# Patient Record
Sex: FEMALE | Race: WHITE | NOT HISPANIC OR LATINO
[De-identification: names, ages, dates, MRNs, and addresses within clinical notes are randomized per-mention and may not be internally consistent; named-entity substitution may affect disease eponyms.]

---

## 2018-09-05 ENCOUNTER — TRANSCRIPTION ENCOUNTER (OUTPATIENT)
Age: 68
End: 2018-09-05

## 2019-01-05 ENCOUNTER — TRANSCRIPTION ENCOUNTER (OUTPATIENT)
Age: 69
End: 2019-01-05

## 2019-03-25 ENCOUNTER — TRANSCRIPTION ENCOUNTER (OUTPATIENT)
Age: 69
End: 2019-03-25

## 2019-12-27 ENCOUNTER — TRANSCRIPTION ENCOUNTER (OUTPATIENT)
Age: 69
End: 2019-12-27

## 2022-03-07 ENCOUNTER — TRANSCRIPTION ENCOUNTER (OUTPATIENT)
Age: 72
End: 2022-03-07

## 2023-02-03 PROBLEM — Z00.00 ENCOUNTER FOR PREVENTIVE HEALTH EXAMINATION: Status: ACTIVE | Noted: 2023-02-03

## 2023-02-17 ENCOUNTER — APPOINTMENT (OUTPATIENT)
Dept: GASTROENTEROLOGY | Facility: CLINIC | Age: 73
End: 2023-02-17
Payer: MEDICARE

## 2023-02-17 VITALS
BODY MASS INDEX: 17.72 KG/M2 | OXYGEN SATURATION: 94 % | SYSTOLIC BLOOD PRESSURE: 110 MMHG | TEMPERATURE: 96.9 F | WEIGHT: 100 LBS | HEIGHT: 63 IN | HEART RATE: 84 BPM | DIASTOLIC BLOOD PRESSURE: 70 MMHG

## 2023-02-17 DIAGNOSIS — Z12.10 ENCOUNTER FOR SCREENING FOR MALIGNANT NEOPLASM OF INTESTINAL TRACT, UNSPECIFIED: ICD-10-CM

## 2023-02-17 PROCEDURE — 99204 OFFICE O/P NEW MOD 45 MIN: CPT

## 2023-02-17 RX ORDER — BIOTIN 2500 MCG
2500 CAPSULE ORAL
Refills: 0 | Status: ACTIVE | COMMUNITY

## 2023-02-17 RX ORDER — CHROMIUM 200 MCG
TABLET ORAL
Refills: 0 | Status: ACTIVE | COMMUNITY

## 2023-02-17 RX ORDER — MULTIVITAMIN/IRON/FOLIC ACID 18MG-0.4MG
500 TABLET ORAL
Refills: 0 | Status: ACTIVE | COMMUNITY

## 2023-02-17 RX ORDER — MULTIVIT-MIN/FOLIC/VIT K/LYCOP 400-300MCG
1000 TABLET ORAL
Refills: 0 | Status: ACTIVE | COMMUNITY

## 2023-02-17 NOTE — HISTORY OF PRESENT ILLNESS
[FreeTextEntry1] : I saw patient Daniel Farmer in the office today.  The patient is a 72-year-old female who has no history of diabetes or coronary issues.  Patient appetite is fair to good but over the past 2 years she has lost weight.  Patient continues to have a well-preserved appetite but feels that she is under a lot of stress and lost her son suddenly.  She states her bowel movements are normal with no blood in the stool but occasionally she has hemorrhoidal bleeding manifested by bright red blood on the toilet tissue.  Mrs. Farmer states she is up-to-date on her gynecological examinations.  Patient has several caffeine a day rarely has ethanol and smokes 10 cigarettes a day.  The patient  recently had imaging of her lungs which revealed plaque as well as a CAT scan of her head due to the fact that she was having positional dizziness.  This has subsequently resolved.  The patient is due for repeat colonoscopy.

## 2023-02-17 NOTE — ASSESSMENT
[FreeTextEntry1] : Mrs. Farmer is a 72-year-old female who currently is on no prescription medications.  Patient is due for repeat colonoscopy and will be scheduled at her earliest convenience.  If normal no further colorectal cancer surveillance is indicated.  Smoking cessation was stressed on several occasions throughout the visit.  The patient has had weight loss over 2 years but has a well-preserved appetite.  It is unlikely that her weight loss is on the basis of an occult neoplasm.  Once the colonoscopy is performed I will distribute a copy of the results.

## 2023-02-17 NOTE — PHYSICAL EXAM
[Alert] : alert [No Acute Distress] : no acute distress [Respiration, Rhythm And Depth] : normal respiratory rhythm and effort [Auscultation Breath Sounds / Voice Sounds] : lungs were clear to auscultation bilaterally [Normal] : heart rate was normal and rhythm regular, normal S1 and S2, no murmurs [Heart Rate And Rhythm] : heart rate was normal and rhythm regular [Murmurs] : no murmurs [Bowel Sounds] : normal bowel sounds [Abdomen Tenderness] : non-tender [Abdomen Soft] : soft [] : no hepatosplenomegaly

## 2023-02-17 NOTE — REVIEW OF SYSTEMS
[Fever] : no fever [Chills] : no chills [Recent Weight Loss (___ Lbs)] : recent [unfilled] ~Ulb weight loss [As Noted in HPI] : as noted in HPI [Cough] : no cough [SOB on Exertion] : no shortness of breath during exertion [Abdominal Pain] : no abdominal pain [Constipation] : no constipation [Diarrhea] : no diarrhea [Heartburn] : no heartburn [Negative] : Cardiovascular

## 2023-02-24 DIAGNOSIS — Z20.822 ENCOUNTER FOR PREPROCEDURAL LABORATORY EXAMINATION: ICD-10-CM

## 2023-02-24 DIAGNOSIS — Z01.812 ENCOUNTER FOR PREPROCEDURAL LABORATORY EXAMINATION: ICD-10-CM

## 2023-06-14 ENCOUNTER — APPOINTMENT (OUTPATIENT)
Dept: GASTROENTEROLOGY | Facility: AMBULATORY MEDICAL SERVICES | Age: 73
End: 2023-06-14
Payer: MEDICARE

## 2023-06-14 PROCEDURE — 45385 COLONOSCOPY W/LESION REMOVAL: CPT | Mod: PT

## 2023-06-14 PROCEDURE — 45380 COLONOSCOPY AND BIOPSY: CPT | Mod: 59,PT
